# Patient Record
Sex: FEMALE | Race: BLACK OR AFRICAN AMERICAN | Employment: OTHER | URBAN - METROPOLITAN AREA
[De-identification: names, ages, dates, MRNs, and addresses within clinical notes are randomized per-mention and may not be internally consistent; named-entity substitution may affect disease eponyms.]

---

## 2018-01-01 ENCOUNTER — HOSPITAL ENCOUNTER (EMERGENCY)
Age: 63
Discharge: HOME | End: 2018-12-30
Attending: EMERGENCY MEDICINE
Payer: MEDICARE

## 2018-01-01 ENCOUNTER — APPOINTMENT (OUTPATIENT)
Dept: GENERAL RADIOLOGY | Age: 63
End: 2018-01-01
Attending: EMERGENCY MEDICINE
Payer: MEDICARE

## 2018-01-01 VITALS
SYSTOLIC BLOOD PRESSURE: 194 MMHG | RESPIRATION RATE: 14 BRPM | HEART RATE: 87 BPM | DIASTOLIC BLOOD PRESSURE: 78 MMHG | BODY MASS INDEX: 47.58 KG/M2 | TEMPERATURE: 97.8 F | WEIGHT: 236 LBS | HEIGHT: 59 IN | OXYGEN SATURATION: 96 %

## 2018-01-01 DIAGNOSIS — N18.9 CHRONIC KIDNEY DISEASE, UNSPECIFIED CKD STAGE: ICD-10-CM

## 2018-01-01 DIAGNOSIS — R60.9 PERIPHERAL EDEMA: Primary | ICD-10-CM

## 2018-01-01 DIAGNOSIS — T14.8XXA BLISTER: ICD-10-CM

## 2018-01-01 LAB
ALBUMIN SERPL-MCNC: 2.6 G/DL (ref 3.4–5)
ALBUMIN/GLOB SERPL: 0.7 {RATIO} (ref 0.8–1.7)
ALP SERPL-CCNC: 74 U/L (ref 45–117)
ALT SERPL-CCNC: 29 U/L (ref 13–56)
ANION GAP SERPL CALC-SCNC: 8 MMOL/L (ref 3–18)
AST SERPL-CCNC: 26 U/L (ref 15–37)
ATRIAL RATE: 91 BPM
BASOPHILS # BLD: 0 K/UL (ref 0–0.1)
BASOPHILS NFR BLD: 0 % (ref 0–2)
BILIRUB SERPL-MCNC: 0.4 MG/DL (ref 0.2–1)
BNP SERPL-MCNC: 805 PG/ML (ref 0–900)
BUN SERPL-MCNC: 48 MG/DL (ref 7–18)
BUN/CREAT SERPL: 19 (ref 12–20)
CALCIUM SERPL-MCNC: 8 MG/DL (ref 8.5–10.1)
CALCULATED P AXIS, ECG09: 64 DEGREES
CALCULATED R AXIS, ECG10: 8 DEGREES
CALCULATED T AXIS, ECG11: 59 DEGREES
CHLORIDE SERPL-SCNC: 107 MMOL/L (ref 100–108)
CO2 SERPL-SCNC: 24 MMOL/L (ref 21–32)
CREAT SERPL-MCNC: 2.52 MG/DL (ref 0.6–1.3)
DIAGNOSIS, 93000: NORMAL
DIFFERENTIAL METHOD BLD: ABNORMAL
EOSINOPHIL # BLD: 0.3 K/UL (ref 0–0.4)
EOSINOPHIL NFR BLD: 3 % (ref 0–5)
ERYTHROCYTE [DISTWIDTH] IN BLOOD BY AUTOMATED COUNT: 13.7 % (ref 11.6–14.5)
GLOBULIN SER CALC-MCNC: 3.6 G/DL (ref 2–4)
GLUCOSE SERPL-MCNC: 163 MG/DL (ref 74–99)
HCT VFR BLD AUTO: 28.1 % (ref 35–45)
HGB BLD-MCNC: 8.9 G/DL (ref 12–16)
LYMPHOCYTES # BLD: 2.3 K/UL (ref 0.9–3.6)
LYMPHOCYTES NFR BLD: 25 % (ref 21–52)
MCH RBC QN AUTO: 30.9 PG (ref 24–34)
MCHC RBC AUTO-ENTMCNC: 31.7 G/DL (ref 31–37)
MCV RBC AUTO: 97.6 FL (ref 74–97)
MONOCYTES # BLD: 0.4 K/UL (ref 0.05–1.2)
MONOCYTES NFR BLD: 5 % (ref 3–10)
NEUTS SEG # BLD: 6 K/UL (ref 1.8–8)
NEUTS SEG NFR BLD: 67 % (ref 40–73)
P-R INTERVAL, ECG05: 144 MS
PLATELET # BLD AUTO: 262 K/UL (ref 135–420)
PMV BLD AUTO: 9.6 FL (ref 9.2–11.8)
POTASSIUM SERPL-SCNC: 4.8 MMOL/L (ref 3.5–5.5)
PROT SERPL-MCNC: 6.2 G/DL (ref 6.4–8.2)
Q-T INTERVAL, ECG07: 352 MS
QRS DURATION, ECG06: 82 MS
QTC CALCULATION (BEZET), ECG08: 432 MS
RBC # BLD AUTO: 2.88 M/UL (ref 4.2–5.3)
SODIUM SERPL-SCNC: 139 MMOL/L (ref 136–145)
VENTRICULAR RATE, ECG03: 91 BPM
WBC # BLD AUTO: 9 K/UL (ref 4.6–13.2)

## 2018-01-01 PROCEDURE — 93005 ELECTROCARDIOGRAM TRACING: CPT

## 2018-01-01 PROCEDURE — 80053 COMPREHEN METABOLIC PANEL: CPT

## 2018-01-01 PROCEDURE — 71045 X-RAY EXAM CHEST 1 VIEW: CPT

## 2018-01-01 PROCEDURE — 85025 COMPLETE CBC W/AUTO DIFF WBC: CPT

## 2018-01-01 PROCEDURE — 83880 ASSAY OF NATRIURETIC PEPTIDE: CPT

## 2018-01-01 PROCEDURE — 99284 EMERGENCY DEPT VISIT MOD MDM: CPT

## 2018-01-01 RX ORDER — TRAMADOL HYDROCHLORIDE 50 MG/1
50 TABLET ORAL
COMMUNITY
End: 2019-01-01 | Stop reason: ALTCHOICE

## 2018-01-01 RX ORDER — ACETAMINOPHEN, DIPHENHYDRAMINE HCL, PHENYLEPHRINE HCL 325; 25; 5 MG/1; MG/1; MG/1
TABLET ORAL
COMMUNITY

## 2018-01-01 RX ORDER — ZOLPIDEM TARTRATE 10 MG/1
10 TABLET ORAL
COMMUNITY
End: 2019-01-01 | Stop reason: ALTCHOICE

## 2018-01-01 RX ORDER — GABAPENTIN 600 MG/1
600 TABLET ORAL 2 TIMES DAILY
COMMUNITY
End: 2019-01-01 | Stop reason: ALTCHOICE

## 2018-01-01 RX ORDER — FAMOTIDINE 20 MG/1
20 TABLET, FILM COATED ORAL
COMMUNITY

## 2018-01-01 RX ORDER — FUROSEMIDE 40 MG/1
40 TABLET ORAL DAILY
COMMUNITY

## 2018-01-01 RX ORDER — METOPROLOL TARTRATE 25 MG/1
25 TABLET, FILM COATED ORAL 2 TIMES DAILY
COMMUNITY

## 2018-01-01 RX ORDER — INSULIN GLARGINE 100 [IU]/ML
INJECTION, SOLUTION SUBCUTANEOUS
COMMUNITY
End: 2019-01-01 | Stop reason: ALTCHOICE

## 2018-01-01 RX ORDER — CYCLOBENZAPRINE HCL 10 MG
TABLET ORAL
COMMUNITY
End: 2019-01-01 | Stop reason: ALTCHOICE

## 2018-01-01 RX ORDER — ASPIRIN 325 MG
325 TABLET ORAL DAILY
COMMUNITY
End: 2019-01-01 | Stop reason: ALTCHOICE

## 2018-01-01 RX ORDER — METOLAZONE 5 MG/1
TABLET ORAL DAILY
COMMUNITY
End: 2019-01-01 | Stop reason: ALTCHOICE

## 2018-01-01 RX ORDER — CLOPIDOGREL BISULFATE 75 MG/1
75 TABLET ORAL
COMMUNITY

## 2018-01-01 RX ORDER — MELOXICAM 7.5 MG/1
TABLET ORAL DAILY
COMMUNITY
End: 2019-01-01 | Stop reason: ALTCHOICE

## 2018-01-01 RX ORDER — ALBUTEROL SULFATE 90 UG/1
AEROSOL, METERED RESPIRATORY (INHALATION)
COMMUNITY

## 2018-01-01 RX ORDER — DOCUSATE SODIUM 100 MG/1
100 CAPSULE, LIQUID FILLED ORAL 2 TIMES DAILY
COMMUNITY

## 2018-12-30 NOTE — ED PROVIDER NOTES
Emergency Department Treatment Report Patient: Jose Youngblood Age: 70 y.o. Sex: female YOB: 1955 Admit Date: 2018 PCP: Vini Morgan MD  
MRN: [id number]  CSN: [id number] Room: [place] [address] Time Dictated: 11:10 PM   
 
Chief Complaint Chief Complaint Patient presents with  Leg Swelling History of Present Illness 70 y.o. female, PMHx HTN, diabetes, CKD (scheduled to see Nephrology in 1 month), presents with acute, moderate leg swelling in her bilateral lower extremities different from her normal. She states that her legs are doubling in size and that her doctor prescribed Lasix but that she has not had a dose for four days. She also had food with high salt over the holidays. Her leg swelling is associated with weight gain, SOB and a blister on her right heel. She denies a recent echocardiogram or history of CHF. No other concerns or Sx at this time. Review of Systems Constitutional: No fever, chills, or weight loss. +weight gain Eyes: No visual symptoms. ENT: No sore throat, runny nose or ear pain. Respiratory: No cough, dyspnea or wheezing. +SOB Cardiovascular: No chest pain, pressure, palpitations, tightness or heaviness. +leg swelling Gastrointestinal: No vomiting, diarrhea or abdominal pain. Genitourinary: No dysuria, frequency, or urgency. Musculoskeletal: No joint pain or swelling. Integumentary: No rashes. +blister Neurological: No headaches, sensory or motor symptoms. Denies complaints in all other systems. Past Medical/Surgical History Past Medical History:  
Diagnosis Date  Diabetes ([address]  Hypertension  Ill-defined condition   
 diverticulitis Past Surgical History:  
Procedure Laterality Date  HX  SECTION Social History Social History Socioeconomic History  Marital status:  Spouse name: Not on file  Number of children: Not on file  Years of education: Not on file  Highest education level: Not on file Tobacco Use  Smoking status: Former Smoker Substance and Sexual Activity  Alcohol use: Yes Comment: occasionally  Drug use: No  
 
 
Family History History reviewed. No pertinent family history. Home Medications Prior to Admission Medications Prescriptions Last Dose Informant Patient Reported? Taking? albuterol (VENTOLIN HFA) 90 mcg/actuation inhaler   Yes Yes Sig: Take  by inhalation. aspirin (ASPIRIN) 325 mg tablet   Yes Yes Sig: Take 325 mg by mouth daily. clopidogrel (PLAVIX) 75 mg tab   Yes Yes Sig: Take 75 mg by mouth. cyclobenzaprine (FLEXERIL) 10 mg tablet   Yes Yes Sig: Take  by mouth three (3) times daily as needed for Muscle Spasm(s). docusate sodium (COLACE) 100 mg capsule   Yes Yes Sig: Take 100 mg by mouth two (2) times a day. dulaglutide (TRULICITY) 5.77 ZI/9.9 mL sub-q pen   Yes No  
Si.75 mg by SubCUTAneous route every seven (7) days. famotidine (PEPCID) 20 mg tablet   Yes Yes Sig: Take 20 mg by mouth two (2) times a day. furosemide (LASIX) 40 mg tablet   Yes Yes Sig: Take 40 mg by mouth daily. gabapentin (NEURONTIN) 600 mg tablet   Yes Yes Sig: Take 600 mg by mouth two (2) times a day. insulin glargine (LANTUS U-100 INSULIN) 100 unit/mL injection   Yes Yes Sig: by SubCUTAneous route nightly. linaclotide (LINZESS) 145 mcg cap capsule   Yes Yes Sig: Take  by mouth Daily (before breakfast). losartan (COZAAR) 25 mg tablet   No No  
Sig: Take 1 Tab by mouth daily. melatonin 10 mg tab   Yes Yes Sig: Take  by mouth.  
meloxicam (MOBIC) 7.5 mg tablet   Yes Yes Sig: Take  by mouth daily. metOLazone (ZAROXOLYN) 5 mg tablet   Yes Yes Sig: Take  by mouth daily. metoprolol tartrate (LOPRESSOR) 25 mg tablet   Yes Yes Sig: Take 25 mg by mouth two (2) times a day. potassium chloride (K-DUR, KLOR-CON) 10 mEq tablet   No No  
Sig: Take 1 Tab by mouth daily. traMADol (ULTRAM) 50 mg tablet   Yes Yes Sig: Take 50 mg by mouth every six (6) hours as needed for Pain.  
zolpidem (AMBIEN) 10 mg tablet   Yes Yes Sig: Take 10 mg by mouth nightly as needed for Sleep. Facility-Administered Medications: None Allergies Allergies Allergen Reactions  Ace Inhibitors Other (comments)  
  hiccoughs  Simvastatin Other (comments) Muscle and joint aches Physical Exam  
 
ED Triage Vitals [12/29/18 [id number] ED Encounter Vitals Group BP (!) 189/101 Pulse (Heart Rate) 96 Resp Rate 17 Temp 97.8 °F (36.6 °C) Temp src O2 Sat (%) 97 % Weight 236 lb Height 4' 11\" Constitutional: Patient appears well developed and well nourished. Appearance and behavior are age and situation appropriate. HEENT: Conjunctiva clear. PERRLA. Mucous membranes moist, non-erythematous. Surface of the pharynx, palate, and tongue are pink, moist and without lesions. Neck: supple, non tender, symmetrical, no masses or JVD. Respiratory: lungs clear to auscultation, nonlabored respirations. No tachypnea or accessory muscle use. Cardiovascular: heart regular rate and rhythm without murmur rubs or gallops. Calves soft and non-tender. Distal pulses 2+ and equal bilaterally. 2+ pitting edema up to her knees on bilateral lower extremities Gastrointestinal:  Abdomen soft, nontender without complaint of pain to palpation Musculoskeletal: Nail beds pink with prompt capillary refill Integumentary: warm and dry without rashes, blister on right heel Neurologic: alert and oriented, Sensation intact, motor strength equal and symmetric. No facial asymmetry or dysarthria. Diagnostic Studies Lab:  
Recent Results (from the past 12 hour(s)) CBC WITH AUTOMATED DIFF Collection Time: 12/29/18 11:04 PM  
Result Value Ref Range WBC 9.0 4.6 - 13.2 K/uL  
 RBC 2.88 (L) 4.20 - 5.30 M/uL HGB 8.9 (L) 12.0 - 16.0 g/dL HCT 28.1 (L) 35.0 - 45.0 % MCV 97.6 (H) 74.0 - 97.0 FL  
 MCH 30.9 24.0 - 34.0 PG  
 MCHC 31.7 31.0 - 37.0 g/dL  
 RDW 13.7 11.6 - 14.5 % PLATELET 887 841 - 870 K/uL MPV 9.6 9.2 - 11.8 FL  
 NEUTROPHILS 67 40 - 73 % LYMPHOCYTES 25 21 - 52 % MONOCYTES 5 3 - 10 % EOSINOPHILS 3 0 - 5 % BASOPHILS 0 0 - 2 %  
 ABS. NEUTROPHILS 6.0 1.8 - 8.0 K/UL  
 ABS. LYMPHOCYTES 2.3 0.9 - 3.6 K/UL  
 ABS. MONOCYTES 0.4 0.05 - 1.2 K/UL  
 ABS. EOSINOPHILS 0.3 0.0 - 0.4 K/UL  
 ABS. BASOPHILS 0.0 0.0 - 0.1 K/UL  
 DF AUTOMATED METABOLIC PANEL, COMPREHENSIVE Collection Time: 12/29/18 11:04 PM  
Result Value Ref Range Sodium 139 136 - 145 mmol/L Potassium 4.8 3.5 - 5.5 mmol/L Chloride 107 100 - 108 mmol/L  
 CO2 24 21 - 32 mmol/L Anion gap 8 3.0 - 18 mmol/L Glucose 163 (H) 74 - 99 mg/dL BUN 48 (H) 7.0 - 18 MG/DL Creatinine 2.52 (H) 0.6 - 1.3 MG/DL  
 BUN/Creatinine ratio 19 12 - 20 GFR est AA 23 (L) >60 ml/min/1.73m2 GFR est non-AA 19 (L) >60 ml/min/1.73m2 Calcium 8.0 (L) 8.5 - 10.1 MG/DL Bilirubin, total 0.4 0.2 - 1.0 MG/DL  
 ALT (SGPT) 29 13 - 56 U/L  
 AST (SGOT) 26 15 - 37 U/L Alk. phosphatase 74 45 - 117 U/L Protein, total 6.2 (L) 6.4 - 8.2 g/dL Albumin 2.6 (L) 3.4 - 5.0 g/dL Globulin 3.6 2.0 - 4.0 g/dL A-G Ratio 0.7 (L) 0.8 - 1.7 NT-PRO BNP Collection Time: 12/29/18 11:04 PM  
Result Value Ref Range NT pro- 0 - 900 PG/ML  
EKG, 12 LEAD, INITIAL Collection Time: 12/29/18 11:38 PM  
Result Value Ref Range Ventricular Rate 91 BPM  
 Atrial Rate 91 BPM  
 P-R Interval 144 ms QRS Duration 82 ms Q-T Interval 352 ms QTC Calculation (Bezet) 432 ms Calculated P Axis 64 degrees Calculated R Axis 8 degrees Calculated T Axis 59 degrees Diagnosis Normal sinus rhythm Possible Left atrial enlargement Borderline ECG When compared with ECG of 02-JUN-2016 13:55, No significant change was found Imaging: No results found. [facility] 
 
ED Course/Medical Decision Making 12:40 AM EKG performed at 2338: Nml sinus rhythm with rate @ 91 bpm, QTc 432, no ST or T-wave changes. Patient in no respiratory distress. CXR does not reveal pulmonary edema and pro-BNP is not severely elevated. Patient has not taken her Lasix and has eaten a high salt diet over the past 4 days. Instructed her to continue her Lasix. Her creatinine is 2.52 today and she says this is around her prior values from her PMD in [place]. No need for emergent dialysis. She has a Nephrology appointment next month and she is establishing care in [place] since moving from [place], [place]. Her right heel blister was incised and drained. No signs of infection. Bedside lower extremity ultrasound to look for DVTs were negative, but she understands that it is an unofficial scan and to repeat in 2 weeks if she is still having leg pain and swelling when restarting her Lasix. Given strict return precautions and she understands. Medications - No data to display Final Diagnosis ICD-10-CM ICD-9-CM 1. Peripheral edema R60.9 782.3 2. Chronic kidney disease, unspecified CKD stage N18.9 585.9 3. Blister T14. 8XXA 919.2 Disposition Patient is discharged home in stable condition. Advised to follow with their primary care physician. Patient advised to return to the ER for any new or worsening symptoms. My Medications CONTINUE taking these medications Instructions Each Dose to Equal Morning Noon Evening Bedtime  
aspirin 325 mg tablet Generic drug:  aspirin Your last dose was: Your next dose is: Take 325 mg by mouth daily. 325 mg 
  
  
  
  
  
clopidogrel 75 mg Tab Commonly known as:  PLAVIX Your last dose was: Your next dose is: Take 75 mg by mouth. 75 mg 
  
  
  
  
  
cyclobenzaprine 10 mg tablet Commonly known as:  FLEXERIL Your last dose was: Your next dose is: Take  by mouth three (3) times daily as needed for Muscle Spasm(s). docusate sodium 100 mg capsule Commonly known as:  Myra Arvizu Your last dose was: Your next dose is: Take 100 mg by mouth two (2) times a day. 100 mg 
  
  
  
  
  
dulaglutide 0.75 mg/0.5 mL sub-q pen Commonly known as:  TRULICITY Your last dose was: Your next dose is: 0.75 mg by SubCUTAneous route every seven (7) days. 0.75 mg 
  
  
  
  
  
famotidine 20 mg tablet Commonly known as:  PEPCID Your last dose was: Your next dose is: Take 20 mg by mouth two (2) times a day. 20 mg 
  
  
  
  
  
furosemide 40 mg tablet Commonly known as:  LASIX Your last dose was: Your next dose is: Take 40 mg by mouth daily. 40 mg 
  
  
  
  
  
gabapentin 600 mg tablet Commonly known as:  NEURONTIN Your last dose was: Your next dose is: Take 600 mg by mouth two (2) times a day. 600 mg 
  
  
  
  
  
LANTUS U-100 INSULIN 100 unit/mL injection Generic drug:  insulin glargine Your last dose was: Your next dose is:   
 
  
 by SubCUTAneous route nightly. LINZESS 145 mcg Cap capsule Generic drug:  linaclotide Your last dose was: Your next dose is: Take  by mouth Daily (before breakfast). losartan 25 mg tablet Commonly known as:  COZAAR Your last dose was: Your next dose is: Take 1 Tab by mouth daily. 25 mg 
  
  
  
  
  
melatonin 10 mg Tab Your last dose was: Your next dose is: Take  by mouth. 
   
  
  
  
  
meloxicam 7.5 mg tablet Commonly known as:  MOBIC Your last dose was: Your next dose is: Take  by mouth daily. metOLazone 5 mg tablet Commonly known as:  Keyona Epp Your last dose was: Your next dose is: Take  by mouth daily. metoprolol tartrate 25 mg tablet Commonly known as:  LOPRESSOR Your last dose was: Your next dose is: Take 25 mg by mouth two (2) times a day. 25 mg 
  
  
  
  
  
potassium chloride 10 mEq tablet Commonly known as:  KLOR-CON Your last dose was: Your next dose is: Take 1 Tab by mouth daily. 10 mEq 
  
  
  
  
  
traMADol 50 mg tablet Commonly known as:  ULTRAM 
 
Your last dose was: Your next dose is: Take 50 mg by mouth every six (6) hours as needed for Pain. 50 mg VENTOLIN HFA 90 mcg/actuation inhaler Generic drug:  albuterol Your last dose was: Your next dose is: Take  by inhalation. zolpidem 10 mg tablet Commonly known as:  AMBIEN Your last dose was: Your next dose is: Take 10 mg by mouth nightly as needed for Sleep. 10 mg [place] Ferguson MD 
December 29, 2018 My signature above authenticates this document and my orders, the final   
diagnosis (es), discharge prescription (s), and instructions in the Epic   
record. If you have any questions please contact [phone]. 
  
Nursing notes have been reviewed by the physician/ advanced practice   
Clinician. Scribe Attestation [facility] acting as a scribe for and in the presence of Nichelle Ly MD     
December 29, 2018 at 11:32 PM 
    
Provider Attestation:     
I personally performed the services described in the documentation, reviewed the documentation, as recorded by the scribe in my presence, and it accurately and completely records my words and actions.  December 29, 2018 at 11:32 PM - Nichelle Ly MD

## 2018-12-30 NOTE — DISCHARGE INSTRUCTIONS
Wear compression stockings. Continue your Lasix and try to eat a low salt diet. Follow-up with your primary care physician, nephrologist (kidney doctor) and Podiatrist (foot doctor). Leg and Ankle Edema: Care Instructions  Your Care Instructions  Swelling in the legs, ankles, and feet is called edema. It is common after you sit or stand for a while. Long plane flights or car rides often cause swelling in the legs and feet. You may also have swelling if you have to stand for long periods of time at your job. Problems with the veins in the legs (varicose veins) and changes in hormones can also cause swelling. Sometimes the swelling in the ankles and feet is caused by a more serious problem, such as heart failure, infection, blood clots, or liver or kidney disease. Follow-up care is a key part of your treatment and safety. Be sure to make and go to all appointments, and call your doctor if you are having problems. It's also a good idea to know your test results and keep a list of the medicines you take. How can you care for yourself at home? · If your doctor gave you medicine, take it as prescribed. Call your doctor if you think you are having a problem with your medicine. · Whenever you are resting, raise your legs up. Try to keep the swollen area higher than the level of your heart. · Take breaks from standing or sitting in one position. ? Walk around to increase the blood flow in your lower legs. ? Move your feet and ankles often while you stand, or tighten and relax your leg muscles. · Wear support stockings. Put them on in the morning, before swelling gets worse. · Eat a balanced diet. Lose weight if you need to. · Limit the amount of salt (sodium) in your diet. Salt holds fluid in the body and may increase swelling. When should you call for help? Call 911 anytime you think you may need emergency care.  For example, call if:    · You have symptoms of a blood clot in your lung (called a pulmonary embolism). These may include:  ? Sudden chest pain. ? Trouble breathing. ? Coughing up blood.    Call your doctor now or seek immediate medical care if:    · You have signs of a blood clot, such as:  ? Pain in your calf, back of the knee, thigh, or groin. ? Redness and swelling in your leg or groin.     · You have symptoms of infection, such as:  ? Increased pain, swelling, warmth, or redness. ? Red streaks or pus. ? A fever.    Watch closely for changes in your health, and be sure to contact your doctor if:    · Your swelling is getting worse.     · You have new or worsening pain in your legs.     · You do not get better as expected. Where can you learn more? Go to http://beba-rox.info/. Enter D184 in the search box to learn more about \"Leg and Ankle Edema: Care Instructions. \"  Current as of: November 20, 2017  Content Version: 11.8  © 1896-3679 [employer]. Care instructions adapted under license by [employer] (which disclaims liability or warranty for this information). If you have questions about a medical condition or this instruction, always ask your healthcare professional. [place] [address] any warranty or liability for your use of this information. Low Sodium Diet (2,000 Milligram): Care Instructions  Your Care Instructions    Too much sodium causes your body to hold on to extra water. This can raise your blood pressure and force your heart and kidneys to work harder. In very serious cases, this could cause you to be put in the hospital. It might even be life-threatening. By limiting sodium, you will feel better and lower your risk of serious problems. The most common source of sodium is salt. People get most of the salt in their diet from canned, prepared, and packaged foods. Fast food and restaurant meals also are very high in sodium. Your doctor will probably limit your sodium to less than 2,000 milligrams (mg) a day. This limit counts all the sodium in prepared and packaged foods and any salt you add to your food. Follow-up care is a key part of your treatment and safety. Be sure to make and go to all appointments, and call your doctor if you are having problems. It's also a good idea to know your test results and keep a list of the medicines you take. How can you care for yourself at home? Read food labels  · Read labels on cans and food packages. The labels tell you how much sodium is in each serving. Make sure that you look at the serving size. If you eat more than the serving size, you have eaten more sodium. · Food labels also tell you the Percent Daily Value for sodium. Choose products with low Percent Daily Values for sodium. · Be aware that sodium can come in forms other than salt, including monosodium glutamate (MSG), sodium citrate, and sodium bicarbonate (baking soda). MSG is often added to Asian food. When you eat out, you can sometimes ask for food without MSG or added salt. Buy low-sodium foods  · Buy foods that are labeled \"unsalted\" (no salt added), \"sodium-free\" (less than 5 mg of sodium per serving), or \"low-sodium\" (less than 140 mg of sodium per serving). Foods labeled \"reduced-sodium\" and \"light sodium\" may still have too much sodium. Be sure to read the label to see how much sodium you are getting. · Buy fresh vegetables, or frozen vegetables without added sauces. Buy low-sodium versions of canned vegetables, soups, and other canned goods. Prepare low-sodium meals  · Cut back on the amount of salt you use in cooking. This will help you adjust to the taste. Do not add salt after cooking. One teaspoon of salt has about 2,300 mg of sodium. · Take the salt shaker off the table. · Flavor your food with garlic, lemon juice, onion, vinegar, herbs, and spices. Do not use soy sauce, lite soy sauce, steak sauce, onion salt, garlic salt, celery salt, mustard, or ketchup on your food.   · Use low-sodium salad dressings, sauces, and ketchup. Or make your own salad dressings and sauces without adding salt. · Use less salt (or none) when recipes call for it. You can often use half the salt a recipe calls for without losing flavor. Other foods such as rice, pasta, and grains do not need added salt. · Rinse canned vegetables, and cook them in fresh water. This removes some--but not all--of the salt. · Avoid water that is naturally high in sodium or that has been treated with water softeners, which add sodium. Call your local water company to find out the sodium content of your water supply. If you buy bottled water, read the label and choose a sodium-free brand. Avoid high-sodium foods  · Avoid eating:  ? Smoked, cured, salted, and canned meat, fish, and poultry. ? Ham, luna, hot dogs, and luncheon meats. ? Regular, hard, and processed cheese and regular peanut butter. ? Crackers with salted tops, and other salted snack foods such as pretzels, chips, and salted popcorn. ? Frozen prepared meals, unless labeled low-sodium. ? Canned and dried soups, broths, and bouillon, unless labeled sodium-free or low-sodium. ? Canned vegetables, unless labeled sodium-free or low-sodium. ? [place] fries, pizza, tacos, and other fast foods. ? Pickles, olives, ketchup, and other condiments, especially soy sauce, unless labeled sodium-free or low-sodium. Where can you learn more? Go to http://beba-rox.info/. Enter G522 in the search box to learn more about \"Low Sodium Diet (2,000 Milligram): Care Instructions. \"  Current as of: March 29, 2018  Content Version: 11.8  © 9470-8560 [employer], [employer]. Care instructions adapted under license by [employer] (which disclaims liability or warranty for this information).  If you have questions about a medical condition or this instruction, always ask your healthcare professional. [place] [address] any warranty or liability for your use of this information. Chronic Kidney Disease: Care Instructions  Your Care Instructions    Chronic kidney disease happens when your kidneys don't work as well as they should. Your kidneys have a few important jobs. They remove waste from your blood. This waste leaves your body in your urine. They also balance your body's fluids and chemicals. When your kidneys don't work well, extra waste and fluid can build up. This can poison the body and sometimes cause death. The most common causes of this disease are diabetes and high blood pressure. In some cases, the disease develops in 2 to 3 months. But it usually develops over many years. If you take medicine and make healthy changes to your lifestyle, you may be able to prevent the disease from getting worse. But if your kidney damage gets worse, you may need dialysis or a kidney transplant. Dialysis uses a machine to filter waste from the blood. A transplant is surgery to give you a healthy kidney from another person. Follow-up care is a key part of your treatment and safety. Be sure to make and go to all appointments, and call your doctor if you are having problems. It's also a good idea to know your test results and keep a list of the medicines you take. How can you care for yourself at home?   Treatments and appointments    · Be safe with medicines. Take your medicines exactly as prescribed. Call your doctor if you have any problems with your medicine. You also may take medicine to control your blood pressure or to treat diabetes. Many people who have diabetes take blood pressure medicine.     · If you have diabetes, do your best to keep your blood sugar in your target range. You may do this by eating healthy food and exercising. You may also take medicines.     · Go to your dialysis appointments if you have this treatment.     · Do not take ibuprofen (Advil, Motrin), naproxen (Aleve), or similar medicines, unless your doctor tells you to.  These may make the disease worse.     · Do not take any vitamins, over-the-counter medicines, or herbal products without talking to your doctor first.     · Do not smoke or use other tobacco products. Smoking can reduce blood flow to the kidneys. If you need help quitting, talk to your doctor about stop-smoking programs and medicines. These can increase your chances of quitting for good.     · Do not drink alcohol or use illegal drugs.     · Talk to your doctor about an exercise plan. Exercise helps lower your blood pressure. It also makes you feel better.     · If you have an advance directive, let your doctor know. It may include a living will and a durable power of  for health care. If you don't have one, you may want to prepare one. It lets your doctor and loved ones know your health care wishes if you become unable to speak for yourself. Diet    · Talk to a registered dietitian. He or she can help you make a meal plan that is right for you. Most people with kidney disease need to limit salt (sodium), fluids, and protein. Some also have to limit potassium and phosphorus.     · You may have to give up many foods you like. But try to focus on the fact that this will help you stay healthy for as long as possible.     · If you have a hard time eating enough, talk to your doctor or dietitian about ways to add calories to your diet.     · Your diet may change as your disease changes. See your doctor for regular testing. And work with a dietitian to change your diet as needed. When should you call for help? Call 911 anytime you think you may need emergency care.  For example, call if:    · You passed out (lost consciousness).    Call your doctor now or seek immediate medical care if:    · You have less urine than normal or no urine.     · You have trouble urinating or can urinate only very small amounts.     · You are confused or have trouble thinking clearly.     · You feel weaker or more tired than usual.     · You are very thirsty, lightheaded, or dizzy.     · You have nausea and vomiting.     · You have new swelling of your arms or feet, or your swelling is worse.     · You have blood in your urine.     · You have new or worse trouble breathing.    Watch closely for changes in your health, and be sure to contact your doctor if:    · You have any problems with your medicine or other treatment. Where can you learn more? Go to http://beba-rox.info/. Enter N276 in the search box to learn more about \"Chronic Kidney Disease: Care Instructions. \"  Current as of: March 15, 2018  Content Version: 11.8  © 2925-1068 [employer]. Care instructions adapted under license by [employer] (which disclaims liability or warranty for this information). If you have questions about a medical condition or this instruction, always ask your healthcare professional. [address] any warranty or liability for your use of this information.

## 2019-01-01 ENCOUNTER — TELEPHONE (OUTPATIENT)
Dept: FAMILY MEDICINE CLINIC | Age: 64
End: 2019-01-01

## 2019-01-01 ENCOUNTER — APPOINTMENT (OUTPATIENT)
Dept: MRI IMAGING | Age: 64
End: 2019-01-01
Attending: EMERGENCY MEDICINE
Payer: MEDICARE

## 2019-01-01 ENCOUNTER — HOSPITAL ENCOUNTER (EMERGENCY)
Age: 64
Discharge: HOME | End: 2019-02-18
Attending: EMERGENCY MEDICINE
Payer: MEDICARE

## 2019-01-01 ENCOUNTER — OFFICE VISIT (OUTPATIENT)
Dept: FAMILY MEDICINE CLINIC | Age: 64
End: 2019-01-01

## 2019-01-01 VITALS
DIASTOLIC BLOOD PRESSURE: 69 MMHG | OXYGEN SATURATION: 99 % | HEART RATE: 78 BPM | WEIGHT: 231 LBS | RESPIRATION RATE: 16 BRPM | SYSTOLIC BLOOD PRESSURE: 125 MMHG | HEIGHT: 59 IN | TEMPERATURE: 97.2 F | BODY MASS INDEX: 46.57 KG/M2

## 2019-01-01 VITALS
SYSTOLIC BLOOD PRESSURE: 156 MMHG | OXYGEN SATURATION: 99 % | HEART RATE: 68 BPM | RESPIRATION RATE: 16 BRPM | TEMPERATURE: 97.9 F | DIASTOLIC BLOOD PRESSURE: 70 MMHG

## 2019-01-01 DIAGNOSIS — E11.621 DIABETIC ULCER OF RIGHT HEEL ASSOCIATED WITH TYPE 2 DIABETES MELLITUS, UNSPECIFIED ULCER STAGE (HCC): ICD-10-CM

## 2019-01-01 DIAGNOSIS — R42 DIZZINESS: Primary | ICD-10-CM

## 2019-01-01 DIAGNOSIS — E87.5 HYPERKALEMIA: ICD-10-CM

## 2019-01-01 DIAGNOSIS — L97.419 DIABETIC ULCER OF RIGHT HEEL ASSOCIATED WITH TYPE 2 DIABETES MELLITUS, UNSPECIFIED ULCER STAGE (HCC): ICD-10-CM

## 2019-01-01 DIAGNOSIS — N18.4 CKD (CHRONIC KIDNEY DISEASE) STAGE 4, GFR 15-29 ML/MIN (HCC): Primary | ICD-10-CM

## 2019-01-01 LAB
ANION GAP SERPL CALC-SCNC: 7 MMOL/L (ref 3–18)
APPEARANCE UR: CLEAR
ATRIAL RATE: 67 BPM
BACTERIA URNS QL MICRO: NEGATIVE /HPF
BASOPHILS # BLD: 0 K/UL (ref 0–0.1)
BASOPHILS NFR BLD: 0 % (ref 0–2)
BILIRUB UR QL: NEGATIVE
BUN SERPL-MCNC: 42 MG/DL (ref 7–18)
BUN/CREAT SERPL: 17 (ref 12–20)
CALCIUM SERPL-MCNC: 8.6 MG/DL (ref 8.5–10.1)
CALCULATED P AXIS, ECG09: 65 DEGREES
CALCULATED R AXIS, ECG10: 15 DEGREES
CALCULATED T AXIS, ECG11: 53 DEGREES
CHLORIDE SERPL-SCNC: 106 MMOL/L (ref 100–108)
CK MB CFR SERPL CALC: 3.4 % (ref 0–4)
CK MB SERPL-MCNC: 2.2 NG/ML (ref 5–25)
CK SERPL-CCNC: 64 U/L (ref 26–192)
CO2 SERPL-SCNC: 25 MMOL/L (ref 21–32)
COLOR UR: YELLOW
CREAT SERPL-MCNC: 2.51 MG/DL (ref 0.6–1.3)
DIAGNOSIS, 93000: NORMAL
DIFFERENTIAL METHOD BLD: ABNORMAL
EOSINOPHIL # BLD: 0.2 K/UL (ref 0–0.4)
EOSINOPHIL NFR BLD: 2 % (ref 0–5)
EPITH CASTS URNS QL MICRO: NORMAL /LPF (ref 0–5)
ERYTHROCYTE [DISTWIDTH] IN BLOOD BY AUTOMATED COUNT: 15.2 % (ref 11.6–14.5)
GLUCOSE SERPL-MCNC: 145 MG/DL (ref 74–99)
GLUCOSE UR STRIP.AUTO-MCNC: NEGATIVE MG/DL
HCT VFR BLD AUTO: 27.4 % (ref 35–45)
HGB BLD-MCNC: 8.2 G/DL (ref 12–16)
HGB UR QL STRIP: NEGATIVE
KETONES UR QL STRIP.AUTO: NEGATIVE MG/DL
LEUKOCYTE ESTERASE UR QL STRIP.AUTO: NEGATIVE
LYMPHOCYTES # BLD: 1.4 K/UL (ref 0.9–3.6)
LYMPHOCYTES NFR BLD: 17 % (ref 21–52)
MCH RBC QN AUTO: 30.7 PG (ref 24–34)
MCHC RBC AUTO-ENTMCNC: 29.9 G/DL (ref 31–37)
MCV RBC AUTO: 102.6 FL (ref 74–97)
MONOCYTES # BLD: 0.5 K/UL (ref 0.05–1.2)
MONOCYTES NFR BLD: 6 % (ref 3–10)
NEUTS SEG # BLD: 6.1 K/UL (ref 1.8–8)
NEUTS SEG NFR BLD: 75 % (ref 40–73)
NITRITE UR QL STRIP.AUTO: NEGATIVE
P-R INTERVAL, ECG05: 164 MS
PH UR STRIP: 5 [PH] (ref 5–8)
PLATELET # BLD AUTO: 288 K/UL (ref 135–420)
PMV BLD AUTO: 9.2 FL (ref 9.2–11.8)
POTASSIUM SERPL-SCNC: 4.5 MMOL/L (ref 3.5–5.5)
PROT UR STRIP-MCNC: 100 MG/DL
Q-T INTERVAL, ECG07: 376 MS
QRS DURATION, ECG06: 78 MS
QTC CALCULATION (BEZET), ECG08: 397 MS
RBC # BLD AUTO: 2.67 M/UL (ref 4.2–5.3)
RBC #/AREA URNS HPF: 0 /HPF (ref 0–5)
SODIUM SERPL-SCNC: 138 MMOL/L (ref 136–145)
SP GR UR REFRACTOMETRY: 1.02 (ref 1–1.03)
TROPONIN I SERPL-MCNC: <0.02 NG/ML (ref 0–0.04)
UROBILINOGEN UR QL STRIP.AUTO: 0.2 EU/DL (ref 0.2–1)
VENTRICULAR RATE, ECG03: 67 BPM
WBC # BLD AUTO: 8.2 K/UL (ref 4.6–13.2)
WBC URNS QL MICRO: NORMAL /HPF (ref 0–4)

## 2019-01-01 PROCEDURE — 80048 BASIC METABOLIC PNL TOTAL CA: CPT

## 2019-01-01 PROCEDURE — 99285 EMERGENCY DEPT VISIT HI MDM: CPT

## 2019-01-01 PROCEDURE — 81001 URINALYSIS AUTO W/SCOPE: CPT

## 2019-01-01 PROCEDURE — 82550 ASSAY OF CK (CPK): CPT

## 2019-01-01 PROCEDURE — 85025 COMPLETE CBC W/AUTO DIFF WBC: CPT

## 2019-01-01 PROCEDURE — 70551 MRI BRAIN STEM W/O DYE: CPT

## 2019-01-01 PROCEDURE — 93005 ELECTROCARDIOGRAM TRACING: CPT

## 2019-01-01 RX ORDER — ACETAMINOPHEN 325 MG/1
TABLET ORAL
COMMUNITY

## 2019-01-01 RX ORDER — CHOLECALCIFEROL (VITAMIN D3) 125 MCG
100 CAPSULE ORAL DAILY
COMMUNITY

## 2019-01-01 RX ORDER — TRAMADOL HYDROCHLORIDE 50 MG/1
50 TABLET ORAL
COMMUNITY

## 2019-01-01 RX ORDER — PRAVASTATIN SODIUM 10 MG/1
TABLET ORAL
COMMUNITY

## 2019-01-01 RX ORDER — GABAPENTIN 300 MG/1
300 CAPSULE ORAL 3 TIMES DAILY
COMMUNITY

## 2019-01-01 RX ORDER — OMEPRAZOLE 20 MG/1
20 CAPSULE, DELAYED RELEASE ORAL DAILY
COMMUNITY

## 2019-01-01 RX ORDER — FLUTICASONE PROPIONATE 50 MCG
2 SPRAY, SUSPENSION (ML) NASAL DAILY
COMMUNITY

## 2019-01-01 RX ORDER — FLUTICASONE FUROATE AND VILANTEROL 200; 25 UG/1; UG/1
1 POWDER RESPIRATORY (INHALATION) DAILY
COMMUNITY

## 2019-01-01 RX ORDER — ERGOCALCIFEROL 1.25 MG/1
50000 CAPSULE ORAL
COMMUNITY

## 2019-01-01 RX ORDER — BENZONATATE 200 MG/1
200 CAPSULE ORAL
COMMUNITY

## 2019-02-05 NOTE — PROGRESS NOTES
Subjective: HPI: 
Alla Colorado is a new patient who presents today to establish care. Medical problems: gastroparesis, chronic kidney disease, acute kidney injury, type 2 diabetes, hyperkalemia, obesity, anemia, peripheral artery disease Last visit with PCP: 12/2018 Last labs: 1/16/19 Last PAP: 2018, hysterectomy 1986 Hx of abnormal pap smears: 
No LMP recorded. Patient is not currently having periods (Reason: Menopause). Family history of GYN cancer: none Family history of breast cancer: none Last mammogram: 2017 Family history of colon cancer: none Last colonoscopy: 2016, had a polyp removed, was diagnosed with gastroparesis, follow up scheduled for 3 years Recent hospitalizations: Pt recently moved to [place] from [place]. She reports that she has been in and out of the hospital 3 times in late December/January. She reports that she first went to the ED for edema in her legs and a blister in her heel on 12/30/18. She reports they drained the fluid in her foot and discharged her. She reports that afterward, she got a call from her PCP in [place] who told her to go to the hospital because of abnormal lab findings. She went to [facility], where they found high potassium. She reports that they found a problem with circulation in her legs. She states that they did a procedure on her veins but cut a vessel. She also says that her kidney function was low in the hospital. She says that while she was in the hospital, she was taken off of Lantus and put on Januvia. She was discharged. Most recently, she was admitted to the hospital on 1/25/19 and discharged on 2/4/19. Pt reports that she is scheduled to see nephrology, podiatry, endocrinology, and vascular specialists. She reports that her podiatrist will not see her until she sees the nephrologist. 
  
Labs performed at [facility] [id number] [facility] Component Name Value Ref Range Potassium 4.7 3.5 - 5.5 mmol/L  
 SODIUM 138 133 - 145 mmol/L  
CHLORIDE 99 98 - 110 mmol/L Glucose 100 (H) 70 - 99 mg/dL CALCIUM 8.8 8.4 - 10.5 mg/dL BUN 53 (H) 6 - 22 mg/dL CREATININE 2.4 (H) 0.8 - 1.4 mg/dL CO2 27 20 - 32 mmol/L  
eGFR African American 24.4 (L) >60.0   
eGFR Non African American 20.2 (L) >60.0 ANION GAP 12.0 mmol/L Lipid Complete Panel 1/16/2019 [employer] Component Name Value Ref Range Cholesterol 281 (H) 110 - 200 mg/dL Triglyceride 127 40 - 149 mg/dL HDL 55 40 - 59 mg/dL Cholesterol/HDL 5.1 0.0 - 5.0 LDL CALCULATION 201 (H) 50 - 99 mg/dL VLDL CALCULATION 25 8 - 30 mg/dL Hemoglobin A1C tomorrow am (01/17/2019 3:44 AM) Hemoglobin A1C tomorrow am (01/17/2019 3:44 AM) Component Value Ref Range Hemoglobin A1c 8.1 (H) 4.8 - 5.9 % Estimated Average Glucose 185 (H) 91 - 123 mg/dL CBC WITH DIFFERENTIAL AUTO (01/31/2019 3:16 AM) Only the most recent of 4 results within the time period is included.   
CBC WITH DIFFERENTIAL AUTO (01/31/2019 3:16 AM) Component Value Ref Range WBC x 10*3 8.3 4.0 - 11.0 K/uL  
RBC x 10^6 2.70 (L) 3.80 - 5.20 M/uL HGB 8.2 (L) 11.7 - 16.1 g/dL HCT 27.3 (L) 35.1 - 48.3 %  (H) 80 - 95 fL  
MCH 30 26 - 34 pg MCHC 30 (L) 31 - 36 g/dL  
RDW 14.4 10.0 - 15.5 % Platelet 912 421 - 940 K/uL MPV 9.7 9.0 - 13.0 fL Segmented Neutrophils 72 40 - 75 % Lymphocytes 22 20 - 45 % Monocytes 5 3 - 12 % Eosinophil 1 0 - 6 % Basophils 0 0 - 2 % Absolute Neutrophils 6.0 1.8 - 7.7 K/uL Absolute Lymphocytes 1.8 1.0 - 4.8 K/uL Absolute Monocyte Count 0.4 0.1 - 1.0 K/uL Absolute Eosinophil 0.1 0.0 - 0.5 K/uL Absolute Basophil Count 0.0 0.0 - 0.2 K/uL Patient complains of pain in foot Leg pain: Pt complains of shooting pain in her lower left leg from where a vessel was cut in the hospital. She states that she takes a Tramadol once nightly with relief. Of note, pt is coughing in the office today. Home health comes to change the bandage on her foot every other day. Her next appointment with podiatry is 2/20/19. Medication bottles reviewed and compared to current medication list. 
 
Current Outpatient Medications Medication Sig Dispense Refill  gabapentin (NEURONTIN) 300 mg capsule Take 300 mg by mouth three (3) times daily.  traMADol (ULTRAM) 50 mg tablet Take 50 mg by mouth every six (6) hours as needed for Pain.  pravastatin (PRAVACHOL) 10 mg tablet Take  by mouth nightly.  SITagliptin (JANUVIA) 25 mg tablet Take 25 mg by mouth daily.  omeprazole (PRILOSEC) 20 mg capsule Take 20 mg by mouth daily.  fluticasone-vilanterol (BREO ELLIPTA) 200-25 mcg/dose inhaler Take 1 Puff by inhalation daily.  ergocalciferol (ERGOCALCIFEROL) 50,000 unit capsule Take 50,000 Units by mouth.  b complex-vitamin c-folic acid (NEPHROCAPS) 1 mg capsule Take 1 Cap by mouth daily.  acetaminophen (TYLENOL) 325 mg tablet Take  by mouth every four (4) hours as needed for Pain.  benzonatate (TESSALON) 200 mg capsule Take 200 mg by mouth three (3) times daily as needed for Cough.  fluticasone (FLONASE) 50 mcg/actuation nasal spray 2 Sprays by Both Nostrils route daily.  co-enzyme Q-10 (CO Q-10) 100 mg capsule Take 100 mg by mouth daily.  famotidine (PEPCID) 20 mg tablet Take 20 mg by mouth nightly.  metoprolol tartrate (LOPRESSOR) 25 mg tablet Take 25 mg by mouth two (2) times a day.  clopidogrel (PLAVIX) 75 mg tab Take 75 mg by mouth.  melatonin 10 mg tab Take  by mouth.  albuterol (VENTOLIN HFA) 90 mcg/actuation inhaler Take  by inhalation.  docusate sodium (COLACE) 100 mg capsule Take 100 mg by mouth two (2) times a day.  furosemide (LASIX) 40 mg tablet Take 40 mg by mouth daily. Allergies Allergen Reactions  Ace Inhibitors Other (comments)  
  hiccoughs  Simvastatin Other (comments) Muscle and joint aches  Trulicity [Dulaglutide] Nausea and Vomiting  
  syncope Past Medical History:  
Diagnosis Date  CAD (coronary artery disease)  Chronic kidney disease  Diabetes ([facility] [address]  Gastroparesis  Hypercholesterolemia  Hypertension  Ill-defined condition   
 diverticulitis  Polio   
 as a child.  Vitamin D deficiency Past Surgical History:  
Procedure Laterality Date  HX CATARACT REMOVAL Bilateral [id number]  HX  SECTION  ,   
 failure to progress.  HX COLONOSCOPY  2016  
 polyps present f/u 3 years.  HX ORTHOPAEDIC Left [id number]  
 carpal tunnel surgery. [address] History reviewed. No pertinent family history. Social History Socioeconomic History  Marital status:  Spouse name: Not on file  Number of children: Not on file  Years of education: Not on file  Highest education level: Not on file Social Needs  Financial resource strain: Not on file  Food insecurity - worry: Not on file  Food insecurity - inability: Not on file  Transportation needs - medical: Not on file  Transportation needs - non-medical: Not on file Occupational History  Not on file Tobacco Use  Smoking status: Former Smoker Packs/day: 1.00 Years: 15.00 Pack years: 15.00 Types: Cigarettes  Smokeless tobacco: Never Used Substance and Sexual Activity  Alcohol use: Yes Frequency: Monthly or less Drinks per session: 1 or 2 Binge frequency: Never Comment: occasionally  Drug use: No  
 Sexual activity: Not Currently Partners: Male Birth control/protection: None, Abstinence Comment:   2013 Other Topics Concern  Not on file Social History Narrative  Not on file REVIEW OF SYSTEM: 
Review of Systems Constitutional: Negative for chills and fever. Eyes: Negative for blurred vision. Respiratory: Positive for cough. Negative for shortness of breath. Cardiovascular: Negative for chest pain, palpitations and leg swelling. Gastrointestinal: Negative for constipation, diarrhea, nausea and vomiting. Musculoskeletal: Positive for myalgias. Negative for joint pain. Neurological: Negative for headaches. Objective:  
 
Visit Vitals /69 (BP 1 Location: Right arm, BP Patient Position: Sitting) Pulse 78 Temp 97.2 °F (36.2 °C) (Oral) Resp 16 Ht 4' 11\" (1.499 m) Wt 231 lb (104.8 kg) SpO2 99% BMI 46.66 kg/m² PHYSICAL EXAM: 
Physical Exam  
Constitutional: She is oriented to person, place, and time and well-developed, well-nourished, and in no distress. HENT:  
Right Ear: Tympanic membrane, external ear and ear canal normal.  
Left Ear: Tympanic membrane, external ear and ear canal normal.  
Nose: Nose normal.  
Mouth/Throat: Oropharynx is clear and moist.  
Eyes: Pupils are equal, round, and reactive to light. Neck: Normal range of motion. Neck supple. No thyromegaly present. Cardiovascular: Normal rate, regular rhythm, normal heart sounds and intact distal pulses. No murmur heard. Pulmonary/Chest: Effort normal and breath sounds normal. She has no wheezes. Musculoskeletal:  
     Right ankle: She exhibits swelling. Left ankle: She exhibits swelling. Neurological: She is alert and oriented to person, place, and time. Skin: Skin is warm and dry. Lesion (right foot) noted. Vitals reviewed. Pt is coughing. Pt ambulates with a cane. Bandage to right heel with drainage. Assessment/Plan: ICD-10-CM ICD-9-CM 1. CKD (chronic kidney disease) stage 4, GFR 15-29 ml/min ([facility]) N18.4 585.4 REFERRAL TO NEPHROLOGY METABOLIC PANEL, COMPREHENSIVE 2. Diabetic ulcer of right heel associated with type 2 diabetes mellitus, unspecified ulcer stage [facility] E11.621 250.80 REFERRAL TO PODIATRY  
 L97.419 707.14 REFERRAL TO VASCULAR SURGERY 3. Hyperkalemia T77.5 234.4 METABOLIC PANEL, COMPREHENSIVE Patient given opportunity to ask questions. Questions answered. Patient understands plan of care. Follow-up Disposition: 
Return in about 3 weeks (around 2/26/2019). Written by Molina Rooney, as dictated by DO. CORY Cooper, Dr. Katie Villa, confirm that all documentation is accurate.

## 2019-02-05 NOTE — PROGRESS NOTES
Patient presents in office today as new patient. Patient concerns: referall to gastro, endocrine, renal, and podiatry.

## 2019-02-11 NOTE — TELEPHONE ENCOUNTER
Osmany Cortez from [facility] stated pt. Has a spot on left and right heel of foot. The left heel has re-opened and she wanted to know if they could use iodosorb gel on that foot as well. She is currently at pt's home. Please advise.

## 2019-02-18 NOTE — ED NOTES
Pt walked from stretcher to bed on arrival, and back and forth to BR. Pt's daughter states she doesn't think the pt can walk. Assured daughter that pt has walked since her arrival to the ED.

## 2019-02-18 NOTE — ED PROVIDER NOTES
EMERGENCY DEPARTMENT HISTORY AND PHYSICAL EXAM 
 
12:47 PM 
 
 
Date: 2/18/2019 Patient Name: Ave Draper History of Presenting Illness Chief Complaint Patient presents with  Dizziness History Provided By: Patient Additional History (Context): Ave Draper is a 70 y.o. female who presents with acute dizziness this morning she describes as off balance. She states the episode lasted approximately 30 minutes. Reports associated SOB. Denies nausea, vomiting, diarrhea, fevers, and chills. Reports a cough for the past 3-4 weeks. Denies history of MI and stroke. PCP: Nuha King DO Chief Complaint: Dizziness Duration: This morning Timing:  Acute Location: N/A Quality: Off balance Severity: N/A Modifying Factors: N/A Associated Symptoms: SOB. Denies nausea, vomiting, diarrhea, fevers, and chills. Current Outpatient Medications Medication Sig Dispense Refill  gabapentin (NEURONTIN) 300 mg capsule Take 300 mg by mouth three (3) times daily.  traMADol (ULTRAM) 50 mg tablet Take 50 mg by mouth every six (6) hours as needed for Pain.  pravastatin (PRAVACHOL) 10 mg tablet Take  by mouth nightly.  SITagliptin (JANUVIA) 25 mg tablet Take 25 mg by mouth daily.  omeprazole (PRILOSEC) 20 mg capsule Take 20 mg by mouth daily.  fluticasone-vilanterol (BREO ELLIPTA) 200-25 mcg/dose inhaler Take 1 Puff by inhalation daily.  ergocalciferol (ERGOCALCIFEROL) 50,000 unit capsule Take 50,000 Units by mouth.  b complex-vitamin c-folic acid (NEPHROCAPS) 1 mg capsule Take 1 Cap by mouth daily.  acetaminophen (TYLENOL) 325 mg tablet Take  by mouth every four (4) hours as needed for Pain.  benzonatate (TESSALON) 200 mg capsule Take 200 mg by mouth three (3) times daily as needed for Cough.  fluticasone (FLONASE) 50 mcg/actuation nasal spray 2 Sprays by Both Nostrils route daily.  co-enzyme Q-10 (CO Q-10) 100 mg capsule Take 100 mg by mouth daily.  famotidine (PEPCID) 20 mg tablet Take 20 mg by mouth nightly.  metoprolol tartrate (LOPRESSOR) 25 mg tablet Take 25 mg by mouth two (2) times a day.  clopidogrel (PLAVIX) 75 mg tab Take 75 mg by mouth.  melatonin 10 mg tab Take  by mouth.  albuterol (VENTOLIN HFA) 90 mcg/actuation inhaler Take  by inhalation.  docusate sodium (COLACE) 100 mg capsule Take 100 mg by mouth two (2) times a day.  furosemide (LASIX) 40 mg tablet Take 40 mg by mouth daily. Past History Past Medical History: 
Past Medical History:  
Diagnosis Date  CAD (coronary artery disease)  Chronic kidney disease  Diabetes ([address]  Gastroparesis  Hypercholesterolemia  Hypertension  Ill-defined condition   
 diverticulitis  Polio   
 as a child.  Vitamin D deficiency Past Surgical History: 
Past Surgical History:  
Procedure Laterality Date  HX CATARACT REMOVAL Bilateral [id number]  HX  SECTION  ,   
 failure to progress.  HX COLONOSCOPY  2016  
 polyps present f/u 3 years.  HX ORTHOPAEDIC Left 1301-1614  
 carpal tunnel surgery. [place] Family History: 
History reviewed. No pertinent family history. Social History: 
Social History Tobacco Use  Smoking status: Former Smoker Packs/day: 1.00 Years: 15.00 Pack years: 15.00 Types: Cigarettes  Smokeless tobacco: Never Used Substance Use Topics  Alcohol use: Yes Frequency: Monthly or less Drinks per session: 1 or 2 Binge frequency: Never Comment: occasionally  Drug use: No  
 
 
Allergies: Allergies Allergen Reactions  Ace Inhibitors Other (comments)  
  hiccoughs  Simvastatin Other (comments) Muscle and joint aches  Trulicity [Dulaglutide] Nausea and Vomiting  
  syncope Review of Systems Review of Systems Constitutional: Negative for activity change, fatigue and fever. HENT: Negative for congestion and rhinorrhea. Eyes: Negative for visual disturbance. Respiratory: Positive for shortness of breath. Cardiovascular: Negative for chest pain and palpitations. Gastrointestinal: Negative for abdominal pain, diarrhea, nausea and vomiting. Genitourinary: Negative for dysuria and hematuria. Musculoskeletal: Negative for back pain. Skin: Negative for rash. Neurological: Positive for dizziness. Negative for weakness and light-headedness. Psychiatric/Behavioral: Negative for agitation. All other systems reviewed and are negative. Physical Exam  
 
Visit Vitals /67 Pulse 67 Temp 97.9 °F (36.6 °C) Resp 16 SpO2 94% Physical Exam  
Constitutional: She appears well-developed and well-nourished. No distress. HENT:  
Head: Normocephalic and atraumatic. Right Ear: External ear normal.  
Left Ear: External ear normal.  
Nose: Nose normal.  
Mouth/Throat: Oropharynx is clear and moist.  
Eyes: Conjunctivae and EOM are normal. Pupils are equal, round, and reactive to light. No scleral icterus. Neck: Normal range of motion. Neck supple. No JVD present. No tracheal deviation present. No thyromegaly present. Cardiovascular: Normal rate and regular rhythm. Exam reveals no friction rub. No murmur heard. Pulmonary/Chest: Effort normal and breath sounds normal. No stridor. She exhibits no tenderness. Abdominal: Soft. Bowel sounds are normal. She exhibits no distension. There is no tenderness. There is no rebound and no guarding. Musculoskeletal: Normal range of motion. She exhibits no edema or tenderness. Lymphadenopathy:  
  She has no cervical adenopathy. Neurological: She is alert. No cranial nerve deficit. Coordination normal.  
Skin: Skin is warm and dry. Psychiatric: She has a normal mood and affect.  Her behavior is normal. Judgment and thought content normal.  
 Nursing note and vitals reviewed. Diagnostic Study Results Labs - Recent Results (from the past 12 hour(s)) CBC WITH AUTOMATED DIFF Collection Time: 02/18/19 12:00 PM  
Result Value Ref Range WBC 8.2 4.6 - 13.2 K/uL  
 RBC 2.67 (L) 4.20 - 5.30 M/uL HGB 8.2 (L) 12.0 - 16.0 g/dL HCT 27.4 (L) 35.0 - 45.0 % .6 (H) 74.0 - 97.0 FL  
 MCH 30.7 24.0 - 34.0 PG  
 MCHC 29.9 (L) 31.0 - 37.0 g/dL  
 RDW 15.2 (H) 11.6 - 14.5 % PLATELET 414 695 - 528 K/uL MPV 9.2 9.2 - 11.8 FL  
 NEUTROPHILS 75 (H) 40 - 73 % LYMPHOCYTES 17 (L) 21 - 52 % MONOCYTES 6 3 - 10 % EOSINOPHILS 2 0 - 5 % BASOPHILS 0 0 - 2 %  
 ABS. NEUTROPHILS 6.1 1.8 - 8.0 K/UL  
 ABS. LYMPHOCYTES 1.4 0.9 - 3.6 K/UL  
 ABS. MONOCYTES 0.5 0.05 - 1.2 K/UL  
 ABS. EOSINOPHILS 0.2 0.0 - 0.4 K/UL  
 ABS. BASOPHILS 0.0 0.0 - 0.1 K/UL  
 DF AUTOMATED METABOLIC PANEL, BASIC Collection Time: 02/18/19 12:00 PM  
Result Value Ref Range Sodium 138 136 - 145 mmol/L Potassium 4.5 3.5 - 5.5 mmol/L Chloride 106 100 - 108 mmol/L  
 CO2 25 21 - 32 mmol/L Anion gap 7 3.0 - 18 mmol/L Glucose 145 (H) 74 - 99 mg/dL BUN 42 (H) 7.0 - 18 MG/DL Creatinine 2.51 (H) 0.6 - 1.3 MG/DL  
 BUN/Creatinine ratio 17 12 - 20 GFR est AA 23 (L) >60 ml/min/1.73m2 GFR est non-AA 19 (L) >60 ml/min/1.73m2 Calcium 8.6 8.5 - 10.1 MG/DL  
CARDIAC PANEL,(CK, CKMB & TROPONIN) Collection Time: 02/18/19 12:00 PM  
Result Value Ref Range CK 64 26 - 192 U/L  
 CK - MB 2.2 <3.6 ng/ml CK-MB Index 3.4 0.0 - 4.0 % Troponin-I, QT <0.02 0.0 - 0.045 NG/ML  
EKG, 12 LEAD, INITIAL Collection Time: 02/18/19 12:06 PM  
Result Value Ref Range Ventricular Rate 67 BPM  
 Atrial Rate 67 BPM  
 P-R Interval 164 ms QRS Duration 78 ms Q-T Interval 376 ms QTC Calculation (Bezet) 397 ms Calculated P Axis 65 degrees Calculated R Axis 15 degrees Calculated T Axis 53 degrees Diagnosis Normal sinus rhythm Nonspecific ST abnormality Abnormal ECG When compared with ECG of 29-DEC-2018 23:38, No significant change was found Radiologic Studies - No orders to display Medical Decision Making It should be noted that Raiza Espinosa MD will be the provider of record for this patient. I reviewed the vital signs, available nursing notes, past medical history, past surgical history, family history and social history. Vital Signs-Reviewed the patient's vital signs. Pulse Oximetry Analysis -  94% on room air (Interpretation) EKG: Interpreted by the EP. Time Interpreted: 5834 Rate: 67 Rhythm: Normal Sinus Rhythm Interpretation: qtc 397. No acute ischemic changes Comparison: No change from previous EKG Records Reviewed: Nursing Notes and Old Medical Records (Time of Review: 12:47 PM) ED Course: Progress Notes, Reevaluation, and Consults: 
 
Provider Notes (Medical Decision Making): 70 y.o. female presenting off balance for about an hour earlier today. Concerned for posterior stroke given age and history so MRI was ordered as well as basic labs, EKG, and cardiac panel.  
 
5:07 PM Patient feeling better. Ambulating safely. MRI with no acute infarct. Labs and EKG showed no acute abnormality. Follow up with PCM and strict return precautions. Diagnosis Clinical Impression: No diagnosis found. Disposition: Home Follow-up Information None Medication List  
  
ASK your doctor about these medications   
acetaminophen 325 mg tablet Commonly known as:  TYLENOL 
  
b complex-vitamin c-folic acid 1 mg capsule Commonly known as:  NEPHROCAPS 
  
benzonatate 200 mg capsule Commonly known as:  TESSALON 
  
clopidogrel 75 mg Tab Commonly known as:  PLAVIX 
  
CO Q-10 100 mg capsule Generic drug:  co-enzyme Q-10 
  
docusate sodium 100 mg capsule Commonly known as:  COLACE 
  
ergocalciferol 50,000 unit capsule Commonly known as:  ERGOCALCIFEROL famotidine 20 mg tablet Commonly known as:  PEPCID 
  
fluticasone 50 mcg/actuation nasal spray Commonly known as:  FLONASE 
  
fluticasone-vilanterol 200-25 mcg/dose inhaler Commonly known as:  BREO ELLIPTA furosemide 40 mg tablet Commonly known as:  LASIX 
  
gabapentin 300 mg capsule Commonly known as:  NEURONTIN 
  
melatonin 10 mg Tab 
  
metoprolol tartrate 25 mg tablet Commonly known as:  LOPRESSOR 
  
omeprazole 20 mg capsule Commonly known as:  PRILOSEC 
  
pravastatin 10 mg tablet Commonly known as:  PRAVACHOL SITagliptin 25 mg tablet Commonly known as:  JANUVIA 
  
traMADol 50 mg tablet Commonly known as:  ULTRAM 
  
VENTOLIN HFA 90 mcg/actuation inhaler Generic drug:  albuterol 
  
  
 
_______________________________ Scribe Attestation [place] Nipple acting as a scribe for and in the presence of Sanchez Orozco MD     
February 18, 2019 at 12:47 PM 
    
Provider Attestation:     
I personally performed the services described in the documentation, reviewed the documentation, as recorded by the scribe in my presence, and it accurately and completely records my words and actions. February 18, 2019 at 12:47 PM - Sanchez Orozco MD   
 
 
_______________________________

## 2019-02-18 NOTE — DISCHARGE INSTRUCTIONS

## 2019-03-08 ENCOUNTER — TELEPHONE (OUTPATIENT)
Dept: FAMILY MEDICINE CLINIC | Age: 64
End: 2019-03-08

## 2019-03-08 NOTE — TELEPHONE ENCOUNTER
Nurse contacted [facility] and was transferred to the homicide division to inquire about the expiration of the patient. Spoke with  Aman Zhang who confirmed that the patient did  on 2019 at her home. I also inquired about a copy of the death certificate. He stated that I would have to get a copy from the family. All questions and concerns addressed and answered. I tried calling the patient's next of kin who is her daughter Ms. Cat Rubin on [phone]. The number directly goes to busy. The home number listed is [phone] and is not receiving calls at this time. Will try to contact [place] Department of Vital Records to inquire about a Verification of death letter. [facility], Office of Vital Records  [place] [address]    General Information Number: [phone]

## 2019-03-11 ENCOUNTER — DOCUMENTATION ONLY (OUTPATIENT)
Dept: FAMILY MEDICINE CLINIC | Age: 64
End: 2019-03-11

## 2019-03-11 NOTE — PROGRESS NOTES
Patient did not arrive to their scheduled appointment on 3/8/19. No show letter #1 sent on 03/11/19. Thank you.